# Patient Record
Sex: FEMALE | Race: WHITE | Employment: UNEMPLOYED | URBAN - METROPOLITAN AREA
[De-identification: names, ages, dates, MRNs, and addresses within clinical notes are randomized per-mention and may not be internally consistent; named-entity substitution may affect disease eponyms.]

---

## 2019-05-21 ENCOUNTER — HOSPITAL ENCOUNTER (OUTPATIENT)
Age: 39
Discharge: HOME | End: 2019-05-21
Payer: COMMERCIAL

## 2019-05-21 LAB
AMPHETAMINE SCREEN, URINE: NORMAL
BARBITURATE SCREEN URINE: NORMAL
BENZODIAZEPINE SCREEN, URINE: NORMAL
CANNABINOID SCREEN URINE: NORMAL
COCAINE METABOLITE SCREEN URINE: NORMAL
ETHANOL: NORMAL MG/DL (ref 0–0.08)
Lab: NORMAL
METHADONE SCREEN, URINE: NORMAL
OPIATE SCREEN URINE: NORMAL
OXYCODONE URINE: NORMAL
PH UA: 5
PHENCYCLIDINE SCREEN URINE: NORMAL
PLATELET # BLD: 259 K/UL (ref 135–450)
PROPOXYPHENE SCREEN: NORMAL

## 2019-05-21 PROCEDURE — G0480 DRUG TEST DEF 1-7 CLASSES: HCPCS

## 2019-05-21 PROCEDURE — 85049 AUTOMATED PLATELET COUNT: CPT

## 2019-05-21 PROCEDURE — 80307 DRUG TEST PRSMV CHEM ANLYZR: CPT

## 2019-05-21 PROCEDURE — 36415 COLL VENOUS BLD VENIPUNCTURE: CPT

## 2019-05-21 PROCEDURE — 84450 TRANSFERASE (AST) (SGOT): CPT

## 2019-05-21 PROCEDURE — 83883 ASSAY NEPHELOMETRY NOT SPEC: CPT

## 2019-05-21 PROCEDURE — 84520 ASSAY OF UREA NITROGEN: CPT

## 2019-05-21 PROCEDURE — 82977 ASSAY OF GGT: CPT

## 2019-05-21 PROCEDURE — 84460 ALANINE AMINO (ALT) (SGPT): CPT

## 2019-05-24 LAB — MISCELLANEOUS LAB TEST ORDER: ABNORMAL

## 2019-07-05 ENCOUNTER — HOSPITAL ENCOUNTER (OUTPATIENT)
Age: 39
Discharge: HOME | End: 2019-07-05
Payer: COMMERCIAL

## 2019-07-05 LAB
AMPHETAMINE SCREEN, URINE: NORMAL
BARBITURATE SCREEN URINE: NORMAL
BENZODIAZEPINE SCREEN, URINE: NORMAL
CANNABINOID SCREEN URINE: NORMAL
COCAINE METABOLITE SCREEN URINE: NORMAL
ETHANOL: NORMAL MG/DL (ref 0–0.08)
Lab: NORMAL
METHADONE SCREEN, URINE: NORMAL
OPIATE SCREEN URINE: NORMAL
OXYCODONE URINE: NORMAL
PH UA: 6
PHENCYCLIDINE SCREEN URINE: NORMAL
PROPOXYPHENE SCREEN: NORMAL

## 2019-07-05 PROCEDURE — G0480 DRUG TEST DEF 1-7 CLASSES: HCPCS

## 2019-07-05 PROCEDURE — 36415 COLL VENOUS BLD VENIPUNCTURE: CPT

## 2019-07-05 PROCEDURE — 80307 DRUG TEST PRSMV CHEM ANLYZR: CPT

## 2019-08-15 ENCOUNTER — HOSPITAL ENCOUNTER (OUTPATIENT)
Age: 39
Discharge: HOME | End: 2019-08-15
Payer: COMMERCIAL

## 2019-08-15 LAB
AMPHETAMINE SCREEN, URINE: NORMAL
BARBITURATE SCREEN URINE: NORMAL
BENZODIAZEPINE SCREEN, URINE: NORMAL
CANNABINOID SCREEN URINE: NORMAL
COCAINE METABOLITE SCREEN URINE: NORMAL
ETHANOL: NORMAL MG/DL (ref 0–0.08)
Lab: NORMAL
METHADONE SCREEN, URINE: NORMAL
OPIATE SCREEN URINE: NORMAL
OXYCODONE URINE: NORMAL
PH UA: 5
PHENCYCLIDINE SCREEN URINE: NORMAL
PROPOXYPHENE SCREEN: NORMAL

## 2019-08-15 PROCEDURE — 36415 COLL VENOUS BLD VENIPUNCTURE: CPT

## 2019-08-15 PROCEDURE — G0480 DRUG TEST DEF 1-7 CLASSES: HCPCS

## 2019-08-15 PROCEDURE — 80307 DRUG TEST PRSMV CHEM ANLYZR: CPT

## 2019-09-27 ENCOUNTER — HOSPITAL ENCOUNTER (OUTPATIENT)
Age: 39
Discharge: HOME | End: 2019-09-27
Payer: COMMERCIAL

## 2019-09-27 LAB
ALBUMIN SERPL-MCNC: 4.4 G/DL (ref 3.4–5)
ALP BLD-CCNC: 90 U/L (ref 40–129)
ALT SERPL-CCNC: 22 U/L (ref 10–40)
AMPHETAMINE SCREEN, URINE: NORMAL
AST SERPL-CCNC: 21 U/L (ref 15–37)
BARBITURATE SCREEN URINE: NORMAL
BASOPHILS ABSOLUTE: 0.1 K/UL (ref 0–0.2)
BASOPHILS RELATIVE PERCENT: 0.7 %
BENZODIAZEPINE SCREEN, URINE: NORMAL
BILIRUB SERPL-MCNC: 0.3 MG/DL (ref 0–1)
BILIRUBIN DIRECT: <0.2 MG/DL (ref 0–0.3)
BILIRUBIN, INDIRECT: NORMAL MG/DL (ref 0–1)
CANNABINOID SCREEN URINE: NORMAL
COCAINE METABOLITE SCREEN URINE: NORMAL
EOSINOPHILS ABSOLUTE: 0.3 K/UL (ref 0–0.6)
EOSINOPHILS RELATIVE PERCENT: 3.4 %
HCT VFR BLD CALC: 38.7 % (ref 36–48)
HEMOGLOBIN: 13 G/DL (ref 12–16)
LYMPHOCYTES ABSOLUTE: 2.2 K/UL (ref 1–5.1)
LYMPHOCYTES RELATIVE PERCENT: 29.1 %
Lab: NORMAL
MCH RBC QN AUTO: 29.7 PG (ref 26–34)
MCHC RBC AUTO-ENTMCNC: 33.7 G/DL (ref 31–36)
MCV RBC AUTO: 88.3 FL (ref 80–100)
METHADONE SCREEN, URINE: NORMAL
MONOCYTES ABSOLUTE: 0.5 K/UL (ref 0–1.3)
MONOCYTES RELATIVE PERCENT: 6.4 %
NEUTROPHILS ABSOLUTE: 4.5 K/UL (ref 1.7–7.7)
NEUTROPHILS RELATIVE PERCENT: 60.4 %
OPIATE SCREEN URINE: NORMAL
OXYCODONE URINE: NORMAL
PDW BLD-RTO: 13.2 % (ref 12.4–15.4)
PH UA: 4
PHENCYCLIDINE SCREEN URINE: NORMAL
PLATELET # BLD: 260 K/UL (ref 135–450)
PLATELET # BLD: 263 K/UL (ref 135–450)
PMV BLD AUTO: 9.3 FL (ref 5–10.5)
PROPOXYPHENE SCREEN: NORMAL
RBC # BLD: 4.38 M/UL (ref 4–5.2)
TOTAL PROTEIN: 8.2 G/DL (ref 6.4–8.2)
TSH SERPL DL<=0.05 MIU/L-ACNC: 2.48 UIU/ML (ref 0.27–4.2)
WBC # BLD: 7.4 K/UL (ref 4–11)

## 2019-09-27 PROCEDURE — 87522 HEPATITIS C REVRS TRNSCRPJ: CPT

## 2019-09-27 PROCEDURE — 84450 TRANSFERASE (AST) (SGOT): CPT

## 2019-09-27 PROCEDURE — 82977 ASSAY OF GGT: CPT

## 2019-09-27 PROCEDURE — 84460 ALANINE AMINO (ALT) (SGPT): CPT

## 2019-09-27 PROCEDURE — 83883 ASSAY NEPHELOMETRY NOT SPEC: CPT

## 2019-09-27 PROCEDURE — 84520 ASSAY OF UREA NITROGEN: CPT

## 2019-09-27 PROCEDURE — 80307 DRUG TEST PRSMV CHEM ANLYZR: CPT

## 2019-09-27 PROCEDURE — 84443 ASSAY THYROID STIM HORMONE: CPT

## 2019-09-27 PROCEDURE — 36415 COLL VENOUS BLD VENIPUNCTURE: CPT

## 2019-09-27 PROCEDURE — 85049 AUTOMATED PLATELET COUNT: CPT

## 2019-09-27 PROCEDURE — 85025 COMPLETE CBC W/AUTO DIFF WBC: CPT

## 2019-09-27 PROCEDURE — 80076 HEPATIC FUNCTION PANEL: CPT

## 2019-09-30 LAB — MISCELLANEOUS LAB TEST ORDER: ABNORMAL

## 2019-10-01 LAB
HCV QNT BY NAAT IU/ML: ABNORMAL IU/ML
HCV QNT BY NAAT LOG IU/ML: 5.23 LOG IU/ML
INTERPRETATION: DETECTED

## 2023-09-26 ENCOUNTER — OFFICE VISIT (OUTPATIENT)
Age: 43
End: 2023-09-26

## 2023-09-26 VITALS
DIASTOLIC BLOOD PRESSURE: 93 MMHG | WEIGHT: 157 LBS | TEMPERATURE: 98.4 F | SYSTOLIC BLOOD PRESSURE: 154 MMHG | BODY MASS INDEX: 26.16 KG/M2 | OXYGEN SATURATION: 96 % | HEART RATE: 98 BPM | HEIGHT: 65 IN

## 2023-09-26 DIAGNOSIS — T24.201A PARTIAL THICKNESS BURN OF RIGHT LOWER EXTREMITY, INITIAL ENCOUNTER: Primary | ICD-10-CM

## 2023-09-26 RX ORDER — CEPHALEXIN 500 MG/1
500 CAPSULE ORAL 4 TIMES DAILY
Qty: 40 CAPSULE | Refills: 0 | Status: SHIPPED | OUTPATIENT
Start: 2023-09-26 | End: 2023-10-06

## 2023-09-26 ASSESSMENT — ENCOUNTER SYMPTOMS
COUGH: 0
NAUSEA: 0

## 2024-01-04 ENCOUNTER — OFFICE VISIT (OUTPATIENT)
Age: 44
End: 2024-01-04

## 2024-01-04 VITALS
OXYGEN SATURATION: 96 % | HEART RATE: 89 BPM | DIASTOLIC BLOOD PRESSURE: 89 MMHG | WEIGHT: 162.8 LBS | RESPIRATION RATE: 20 BRPM | BODY MASS INDEX: 27.12 KG/M2 | SYSTOLIC BLOOD PRESSURE: 141 MMHG | HEIGHT: 65 IN | TEMPERATURE: 98.2 F

## 2024-01-04 DIAGNOSIS — R30.0 DYSURIA: Primary | ICD-10-CM

## 2024-01-04 LAB
BILIRUBIN, POC: NEGATIVE
BLOOD URINE, POC: ABNORMAL
CLARITY, POC: ABNORMAL
COLOR, POC: YELLOW
GLUCOSE URINE, POC: NEGATIVE
KETONES, POC: NEGATIVE
LEUKOCYTE EST, POC: ABNORMAL
NITRITE, POC: NEGATIVE
PH, POC: 6
PROTEIN, POC: ABNORMAL
SPECIFIC GRAVITY, POC: 1.02
UROBILINOGEN, POC: ABNORMAL

## 2024-01-04 RX ORDER — CIPROFLOXACIN 500 MG/1
500 TABLET, FILM COATED ORAL 2 TIMES DAILY
Qty: 14 TABLET | Refills: 0 | Status: SHIPPED | OUTPATIENT
Start: 2024-01-04 | End: 2024-01-11

## 2024-01-04 NOTE — PROGRESS NOTES
Aurea White (:  1980) is a 43 y.o. female,New patient, here for evaluation of the following chief complaint(s):  Urinary Tract Infection (Frequency, burning and blood in urine.)      ASSESSMENT/PLAN:  Diagnosis:  Urinary Tract infection     Home to rest  Increase fluids   Tylenol or motrin for pain/fever    Follow up in 7 days if symptoms persist or if symptoms worsen.    SUBJECTIVE/OBJECTIVE:    Urinary Tract Infection   C/O burning increased frequency as well as blood noted in urine   C/O lower abd pain         Vitals:    24 1809   BP: (!) 141/89   Pulse: 89   Resp: 20   Temp: 98.2 °F (36.8 °C)   SpO2: 96%   Weight: 73.8 kg (162 lb 12.8 oz)   Height: 1.651 m (5' 5\")       Review of Systems   Genitourinary:  Positive for dysuria.     Physical Exam  Cardiovascular:      Rate and Rhythm: Normal rate.      Heart sounds: Normal heart sounds.   Pulmonary:      Effort: Pulmonary effort is normal.      Breath sounds: Normal breath sounds.   Abdominal:      Palpations: Abdomen is soft.      Tenderness: There is abdominal tenderness.   Musculoskeletal:      Cervical back: Neck supple.   Skin:     General: Skin is warm and dry.   Neurological:      Mental Status: She is oriented to person, place, and time.       An electronic signature was used to authenticate this note.    --ALEJANDRA Wheatley - CNP

## 2024-01-18 ENCOUNTER — OFFICE VISIT (OUTPATIENT)
Age: 44
End: 2024-01-18

## 2024-01-18 VITALS
BODY MASS INDEX: 27.12 KG/M2 | SYSTOLIC BLOOD PRESSURE: 140 MMHG | HEART RATE: 96 BPM | OXYGEN SATURATION: 96 % | WEIGHT: 163 LBS | TEMPERATURE: 98 F | DIASTOLIC BLOOD PRESSURE: 80 MMHG

## 2024-01-18 DIAGNOSIS — J01.10 ACUTE NON-RECURRENT FRONTAL SINUSITIS: Primary | ICD-10-CM

## 2024-01-18 RX ORDER — PSEUDOEPHEDRINE HCL 30 MG
30 TABLET ORAL EVERY 6 HOURS PRN
Qty: 40 TABLET | Refills: 0 | Status: SHIPPED | OUTPATIENT
Start: 2024-01-18 | End: 2024-01-28

## 2024-01-18 RX ORDER — AZITHROMYCIN 250 MG/1
250 TABLET, FILM COATED ORAL SEE ADMIN INSTRUCTIONS
Qty: 6 TABLET | Refills: 0 | Status: SHIPPED | OUTPATIENT
Start: 2024-01-18 | End: 2024-01-23

## 2024-01-18 RX ORDER — GABAPENTIN 400 MG/1
CAPSULE ORAL
COMMUNITY
Start: 2024-01-16

## 2024-01-18 NOTE — PROGRESS NOTES
Aurea White (:  1980) is a 43 y.o. female,Established patient, here for evaluation of the following chief complaint(s):  Pharyngitis (Sore throat, stiff neck, swelling and warm to the touch, headaches, hands swollen in the morning. X 4 days)      ASSESSMENT/PLAN:  Visit Diagnoses and Associated Orders       Acute non-recurrent frontal sinusitis    -  Primary    azithromycin (ZITHROMAX) 250 MG tablet [40435]      pseudoephedrine (DECONGESTANT) 30 MG tablet [[id number]]           ORDERS WITHOUT AN ASSOCIATED DIAGNOSIS    gabapentin (NEURONTIN) 400 MG capsule [11894]              Follow up in the ED if symptoms persist or if symptoms worsen.    SUBJECTIVE/OBJECTIVE:  HPI  HPI:   43 y.o. female presents with symptoms of Sinus Pain  Patient complains of congestion, cough, nasal congestion, post nasal drip, sinus pressure, and sore throat. Symptoms have been gradually worsening since that time. She is drinking moderate amounts of fluids.  Past history is significant for nothing.  Sore Throat  Patient complains of sore throat. Associated symptoms include nasal blockage, post nasal drip, sinus and nasal congestion, and sore throat.Onset of symptoms was  gradually worsening since that time. She is drinking moderate amounts of fluids. She has not had recent close exposure to someone with proven streptococcal pharyngitis.         Vitals:    24 1528 24 1545   BP: (!) 163/84 (!) 140/80  Comment: in 'pain'   Site: Right Upper Arm Right Upper Arm   Position: Sitting Sitting   Cuff Size: Medium Adult    Pulse: 96    Temp: 98 °F (36.7 °C)    TempSrc: Oral    SpO2: 96%    Weight: 73.9 kg (163 lb)          Physical Exam      An electronic signature was used to authenticate this note.    --Winsome Becerra, ALEJANDRA - CNP

## 2024-01-18 NOTE — PATIENT INSTRUCTIONS
Discharge medications reviewed with the patient and appropriate educational materials and side effects teaching were provided.     Follow up with PCP instructions Re: muscle pain and migraines.

## 2025-03-05 ENCOUNTER — OFFICE VISIT (OUTPATIENT)
Age: 45
End: 2025-03-05

## 2025-03-05 VITALS
WEIGHT: 168 LBS | TEMPERATURE: 98.2 F | SYSTOLIC BLOOD PRESSURE: 150 MMHG | DIASTOLIC BLOOD PRESSURE: 90 MMHG | OXYGEN SATURATION: 93 % | RESPIRATION RATE: 16 BRPM | HEIGHT: 65 IN | HEART RATE: 78 BPM | BODY MASS INDEX: 27.99 KG/M2

## 2025-03-05 DIAGNOSIS — H66.92 ACUTE LEFT OTITIS MEDIA: ICD-10-CM

## 2025-03-05 DIAGNOSIS — J20.9 ACUTE BRONCHITIS, UNSPECIFIED ORGANISM: Primary | ICD-10-CM

## 2025-03-05 DIAGNOSIS — R03.0 ELEVATED BP WITHOUT DIAGNOSIS OF HYPERTENSION: ICD-10-CM

## 2025-03-05 RX ORDER — BENZONATATE 100 MG/1
100 CAPSULE ORAL 3 TIMES DAILY PRN
Qty: 30 CAPSULE | Refills: 0 | Status: SHIPPED | OUTPATIENT
Start: 2025-03-05 | End: 2025-03-15

## 2025-03-05 RX ORDER — PALIPERIDONE 3 MG/1
TABLET, EXTENDED RELEASE ORAL
COMMUNITY

## 2025-03-05 RX ORDER — OSELTAMIVIR PHOSPHATE 75 MG/1
CAPSULE ORAL
COMMUNITY
Start: 2025-03-02

## 2025-03-05 RX ORDER — DOXYCYCLINE 100 MG/1
1 TABLET ORAL 2 TIMES DAILY
COMMUNITY

## 2025-03-05 RX ORDER — CELECOXIB 200 MG/1
1 CAPSULE ORAL DAILY
COMMUNITY

## 2025-03-05 RX ORDER — FLUTICASONE PROPIONATE 50 MCG
SPRAY, SUSPENSION (ML) NASAL
COMMUNITY

## 2025-03-05 RX ORDER — VALSARTAN 160 MG/1
160 TABLET ORAL DAILY
COMMUNITY
Start: 2025-01-08

## 2025-03-05 RX ORDER — ARIPIPRAZOLE 2 MG/1
TABLET ORAL
COMMUNITY

## 2025-03-05 RX ORDER — PREDNISONE 20 MG/1
TABLET ORAL
COMMUNITY

## 2025-03-05 RX ORDER — PREDNISONE 20 MG/1
20 TABLET ORAL 2 TIMES DAILY
Qty: 10 TABLET | Refills: 0 | Status: SHIPPED | OUTPATIENT
Start: 2025-03-05 | End: 2025-03-10

## 2025-03-05 RX ORDER — VENLAFAXINE HYDROCHLORIDE 37.5 MG/1
CAPSULE, EXTENDED RELEASE ORAL
COMMUNITY

## 2025-03-05 RX ORDER — AZITHROMYCIN 250 MG/1
TABLET, FILM COATED ORAL
COMMUNITY

## 2025-03-05 RX ORDER — BENZTROPINE MESYLATE 0.5 MG/1
TABLET ORAL
COMMUNITY

## 2025-03-05 RX ORDER — FLUCONAZOLE 150 MG/1
1 TABLET ORAL DAILY
COMMUNITY

## 2025-03-05 RX ORDER — CYCLOBENZAPRINE HCL 10 MG
1 TABLET ORAL 3 TIMES DAILY PRN
COMMUNITY

## 2025-03-05 ASSESSMENT — ENCOUNTER SYMPTOMS
RHINORRHEA: 0
WHEEZING: 1
SHORTNESS OF BREATH: 0
COUGH: 1

## 2025-03-06 NOTE — PROGRESS NOTES
for cough and wheezing. Negative for shortness of breath.    Cardiovascular:  Negative for chest pain.   Skin:  Negative for rash.   Neurological:  Negative for headaches.       Physical Exam  Constitutional:       General: She is not in acute distress.  HENT:      Right Ear: Tympanic membrane is not erythematous.      Left Ear: Tympanic membrane is erythematous and bulging.      Nose: Congestion present.      Mouth/Throat:      Mouth: Mucous membranes are moist.      Pharynx: No oropharyngeal exudate or posterior oropharyngeal erythema.   Eyes:      Conjunctiva/sclera: Conjunctivae normal.      Pupils: Pupils are equal, round, and reactive to light.   Cardiovascular:      Rate and Rhythm: Normal rate and regular rhythm.   Pulmonary:      Effort: Pulmonary effort is normal. No respiratory distress (has a bronchial cough).      Breath sounds: Normal breath sounds.   Musculoskeletal:      Cervical back: Neck supple. No rigidity.      Right lower leg: No edema.      Left lower leg: No edema.   Skin:     Findings: No rash.   Neurological:      General: No focal deficit present.      Mental Status: She is alert and oriented to person, place, and time.           An electronic signature was used to authenticate this note.    --VARINDER CHAVEZ MD

## 2025-06-21 DIAGNOSIS — H66.92 ACUTE LEFT OTITIS MEDIA: ICD-10-CM

## 2025-06-21 DIAGNOSIS — J20.9 ACUTE BRONCHITIS, UNSPECIFIED ORGANISM: ICD-10-CM
